# Patient Record
Sex: FEMALE | Race: WHITE | Employment: PART TIME | URBAN - METROPOLITAN AREA
[De-identification: names, ages, dates, MRNs, and addresses within clinical notes are randomized per-mention and may not be internally consistent; named-entity substitution may affect disease eponyms.]

---

## 2017-06-16 ENCOUNTER — OFFICE VISIT (OUTPATIENT)
Dept: INTERNAL MEDICINE CLINIC | Facility: CLINIC | Age: 22
End: 2017-06-16

## 2017-06-16 VITALS
BODY MASS INDEX: 19.77 KG/M2 | WEIGHT: 132 LBS | DIASTOLIC BLOOD PRESSURE: 84 MMHG | TEMPERATURE: 98 F | OXYGEN SATURATION: 97 % | HEART RATE: 80 BPM | RESPIRATION RATE: 18 BRPM | HEIGHT: 68.5 IN | SYSTOLIC BLOOD PRESSURE: 102 MMHG

## 2017-06-16 DIAGNOSIS — Z00.00 ANNUAL PHYSICAL EXAM: ICD-10-CM

## 2017-06-16 DIAGNOSIS — Z02.89 ENCOUNTER FOR PHYSICAL EXAMINATION RELATED TO EMPLOYMENT: Primary | ICD-10-CM

## 2017-06-16 PROCEDURE — 90715 TDAP VACCINE 7 YRS/> IM: CPT | Performed by: INTERNAL MEDICINE

## 2017-06-16 PROCEDURE — 99395 PREV VISIT EST AGE 18-39: CPT | Performed by: INTERNAL MEDICINE

## 2017-06-16 PROCEDURE — 86580 TB INTRADERMAL TEST: CPT | Performed by: INTERNAL MEDICINE

## 2017-06-16 PROCEDURE — 90471 IMMUNIZATION ADMIN: CPT | Performed by: INTERNAL MEDICINE

## 2017-06-16 NOTE — PROGRESS NOTES
Pt presents for TB test and Tdap injection. Full name and  verified. Pt states never testing positive for PPD. PPD 0.1ml administered intradermal to right forearm. Tolerated well. Pt informed to return to office 48-72 hours for TB read.  Voiced [place]

## 2017-06-16 NOTE — PROGRESS NOTES
Gill Ashraf is a 24year old female. Patient presents with:  Physical: Pt states needs a physical for work, 1 step tb test, and tdap. Pt states last pap 2 years ago.       HPI:   Gill Ashraf is a 24year old female who presents for a complete phy cleft palate surg.     OTHER SURGICAL HISTORY Right Sept 2014    Comment Removal of R breast benign tumor    OTHER SURGICAL HISTORY  Dec 12, 2014    Comment Removal of [place] teeth      Family History   Problem Relation Age of Onset   • Diabetes Paternal Gr will return on Monday to have it read. - TB INTRADERMAL TEST  - TETANUS, DIPHTHERIA TOXOIDS AND ACELLULAR PERTUSIS VACCINE (TDAP), >7 YEARS, IM USE    2. Annual physical exam  TDAP given today; normal pap done with gyne last year.    - TETANUS, DIPHTHERIA

## 2017-06-19 ENCOUNTER — APPOINTMENT (OUTPATIENT)
Dept: INTERNAL MEDICINE CLINIC | Facility: CLINIC | Age: 22
End: 2017-06-19

## 2017-06-19 DIAGNOSIS — Z09 NEED FOR IMMUNIZATION FOLLOW-UP: Primary | ICD-10-CM

## 2017-07-08 RX ORDER — SELENIUM SULFIDE 2.5 MG/100ML
LOTION TOPICAL
Qty: 118 ML | Refills: 1 | Status: SHIPPED | OUTPATIENT
Start: 2017-07-08 | End: 2018-09-28

## 2017-07-08 NOTE — TELEPHONE ENCOUNTER
Rx for Selenium Sulfide 2.5% lotion, apply to affected area for 10 min daily for 1 week,   118 mL, 1 refill ERx'd to [facility] in [place] per [facility] written order.

## 2017-08-14 NOTE — TELEPHONE ENCOUNTER
Pt. has HMO insurance now and needs a written referral to her Dr. Mercy Moreno. Pt. Has an appointment with that  On Thursday 8-17-17. She can be reached at [phone].

## 2017-08-14 NOTE — TELEPHONE ENCOUNTER
Please advise - called patient who is seeing DR. Seng Sawyer [facility] for ADHD - referral started and pending - to DR. Isabel Medeiros

## 2017-08-15 NOTE — TELEPHONE ENCOUNTER
Patient has to contact [facility] for a referral.   [phone]    Thank you, Todd

## 2017-08-15 NOTE — TELEPHONE ENCOUNTER
Called patient and left voicemail per hipaa relaying that referral was ordered and forwarded to managed care department. Instructed to call back with any questions.     To managed care---patient has appt on 8/17/17

## 2017-09-07 ENCOUNTER — OFFICE VISIT (OUTPATIENT)
Dept: INTERNAL MEDICINE CLINIC | Facility: CLINIC | Age: 22
End: 2017-09-07

## 2017-09-07 VITALS
BODY MASS INDEX: 19.77 KG/M2 | TEMPERATURE: 99 F | WEIGHT: 132 LBS | HEART RATE: 72 BPM | HEIGHT: 68.5 IN | DIASTOLIC BLOOD PRESSURE: 64 MMHG | SYSTOLIC BLOOD PRESSURE: 98 MMHG

## 2017-09-07 DIAGNOSIS — N64.4 BREAST TENDERNESS: Primary | ICD-10-CM

## 2017-09-07 PROCEDURE — 99212 OFFICE O/P EST SF 10 MIN: CPT | Performed by: INTERNAL MEDICINE

## 2017-09-07 PROCEDURE — 99213 OFFICE O/P EST LOW 20 MIN: CPT | Performed by: INTERNAL MEDICINE

## 2017-09-07 NOTE — PROGRESS NOTES
Tripp Yepez is a 20year old female. Patient presents with:  Breast Pain    HPI:     Pt thinks she felt a lump in her right lateral breast, along with some soreness. Also with some soreness in left lateral breast.  Comes and goes. No new bra, meds. LMP 08/11/2017 (Exact Date)   BMI 19.78 kg/m²   GENERAL: well developed, well nourished, in no apparent distress  BREASTS: very dense breasts bilat, +vertical ridge of firm breast tissue along lateral right breast, +tender. No discrete masses.   Mild tende

## 2017-11-28 ENCOUNTER — TELEPHONE (OUTPATIENT)
Dept: INTERNAL MEDICINE CLINIC | Facility: CLINIC | Age: 22
End: 2017-11-28

## 2017-11-28 NOTE — TELEPHONE ENCOUNTER
[phone] mother Zack Heard  Pt has a new job and needs a note confirming that she had chickenpox as a child  Please fax to mother by Friday.    [phone]

## 2017-11-28 NOTE — TELEPHONE ENCOUNTER
To Dr. Monique Ends - see below - pt starting new job in hospital, mother states pt had chicken pox when she was around 3-4 y/o. She never received immunization. Did not find documentation in records that were sent to us. Mother will also call pediatrician's office to see if they have documentation.

## 2018-01-22 ENCOUNTER — OFFICE VISIT (OUTPATIENT)
Dept: INTERNAL MEDICINE CLINIC | Facility: CLINIC | Age: 23
End: 2018-01-22

## 2018-01-22 VITALS
HEIGHT: 68.5 IN | HEART RATE: 100 BPM | WEIGHT: 131 LBS | BODY MASS INDEX: 19.63 KG/M2 | OXYGEN SATURATION: 99 % | DIASTOLIC BLOOD PRESSURE: 66 MMHG | SYSTOLIC BLOOD PRESSURE: 108 MMHG | TEMPERATURE: 98 F

## 2018-01-22 DIAGNOSIS — M79.10 MYALGIA: Primary | ICD-10-CM

## 2018-01-22 DIAGNOSIS — J01.00 ACUTE NON-RECURRENT MAXILLARY SINUSITIS: ICD-10-CM

## 2018-01-22 LAB
FLUAV + FLUBV RNA SPEC NAA+PROBE: NEGATIVE
FLUAV + FLUBV RNA SPEC NAA+PROBE: NEGATIVE
FLUAV + FLUBV RNA SPEC NAA+PROBE: POSITIVE

## 2018-01-22 PROCEDURE — 99213 OFFICE O/P EST LOW 20 MIN: CPT | Performed by: INTERNAL MEDICINE

## 2018-01-22 PROCEDURE — 99212 OFFICE O/P EST SF 10 MIN: CPT | Performed by: INTERNAL MEDICINE

## 2018-01-22 NOTE — PROGRESS NOTES
Tripp Yepez is a 20year old female. Patient presents with:  Cough: Complaints of cough with sputum, chest congestion, fatigue - since friday = works in [employer] and the unit that she worked had flu outbreak       HPI:   Tripp Yepez is a benign tumor  Dec 12, 2014: OTHER SURGICAL HISTORY      Comment: Removal of [place] teeth   Family History   Problem Relation Age of Onset   • Other [OTHER] Mother      MOM W/ BREAST FIBRORYSTIS DISEASE   • Diabetes Father 52   • Diabetes Paternal [place]

## 2018-01-23 ENCOUNTER — TELEPHONE (OUTPATIENT)
Dept: INTERNAL MEDICINE CLINIC | Facility: CLINIC | Age: 23
End: 2018-01-23

## 2018-01-23 RX ORDER — OSELTAMIVIR PHOSPHATE 75 MG/1
75 CAPSULE ORAL 2 TIMES DAILY
Qty: 10 CAPSULE | Refills: 0 | Status: SHIPPED | OUTPATIENT
Start: 2018-01-23 | End: 2018-12-06

## 2018-01-23 NOTE — TELEPHONE ENCOUNTER
Received phone call from [place] in lab notifying us of +Influenza A. Dr Caroline Serna has already seen results and advised below. Called patient and relayed Dr Dean Mosley message below. Patient verbalized understanding.

## 2018-01-23 NOTE — TELEPHONE ENCOUNTER
Please notify patient of positive flu swab. I sent in tamiflu for her. She needs to work on hydration.

## 2018-01-23 NOTE — TELEPHONE ENCOUNTER
Spoke with patient and relayed Dr. Xi Rojas' message. Patient verbalized an understanding and states that she is off Thursday. She is requesting the letter be changed to say that she can return on Friday 1/26.  Dr. Xi Rojas verbally notified and new letter

## 2018-01-29 NOTE — TELEPHONE ENCOUNTER
Pt. Wants to know if Dr. Traci Child can write her a return to work note. Pt. Zahra Life to work Friday and left early because she wasn't feeling good. She didn't go to work on Sat. Or SUn. Pt. Will be going to work today  Please advise  Ph.  # [phone]   Routed to [facility]

## 2018-09-28 ENCOUNTER — TELEPHONE (OUTPATIENT)
Dept: INTERNAL MEDICINE CLINIC | Facility: CLINIC | Age: 23
End: 2018-09-28

## 2018-09-28 DIAGNOSIS — B36.0 TINEA VERSICOLOR: Primary | ICD-10-CM

## 2018-09-28 RX ORDER — SELENIUM SULFIDE 2.5 MG/100ML
LOTION TOPICAL
Qty: 1 BOTTLE | Refills: 0 | Status: SHIPPED | OUTPATIENT
Start: 2018-09-28 | End: 2020-11-12 | Stop reason: ALTCHOICE

## 2018-09-28 NOTE — TELEPHONE ENCOUNTER
LOV: 6-3-16 pt requesting refill on Selenium sulfide 2.5% External lotion. Spoke with pt that she has not been seen in office since 2016 and would need an f/u appt for further refills.  Pt verbalized she has been using the lotion more frequent then she norm

## 2018-09-28 NOTE — TELEPHONE ENCOUNTER
Pt called needs referral for appt with dermatology - Dr Jaciel Duque  Oct 1     Please call pt to confirm [phone]

## 2018-09-28 NOTE — TELEPHONE ENCOUNTER
Spoke to patient who states [facility] this issue (tinea versicolor) and has appointment scheduled for Monday 10/1. Referral generated per protocol.  To Dr.Spirakes DOUGHERTY

## 2018-10-01 ENCOUNTER — OFFICE VISIT (OUTPATIENT)
Dept: DERMATOLOGY CLINIC | Facility: CLINIC | Age: 23
End: 2018-10-01

## 2018-10-01 DIAGNOSIS — B36.0 TINEA VERSICOLOR: Primary | ICD-10-CM

## 2018-10-01 PROCEDURE — 99212 OFFICE O/P EST SF 10 MIN: CPT | Performed by: DERMATOLOGY

## 2018-10-01 PROCEDURE — 99213 OFFICE O/P EST LOW 20 MIN: CPT | Performed by: DERMATOLOGY

## 2018-10-01 RX ORDER — TERBINAFINE HYDROCHLORIDE 250 MG/1
250 TABLET ORAL DAILY
Qty: 14 TABLET | Refills: 0 | Status: SHIPPED | OUTPATIENT
Start: 2018-10-01 | End: 2018-10-15

## 2018-10-01 RX ORDER — SELENIUM SULFIDE 2.5 MG/100ML
LOTION TOPICAL
Qty: 1 BOTTLE | Refills: 3 | Status: SHIPPED | OUTPATIENT
Start: 2018-10-01 | End: 2018-12-06

## 2018-10-01 NOTE — PROGRESS NOTES
HPI:     Chief Complaint     Derm Problem        HPI     Derm Problem      Additional comments: LOV 06/03/16 pt seen for rash here for flare up on \"blotchy skin\" pt has discolored patches on her upper body that she things was triggered by increased stess total) by mouth 2 (two) times daily.  Disp: 10 capsule Rfl: 0     Allergies:     Dog Dander [Dander]       Seasonal                    Past Medical History:   Diagnosis Date   • Anxiety state, unspecified 2003    Paxil   • Attention deficit disorder with hy Age of Onset   • Other (Other) Mother         MOM W/ BREAST FIBRORYSTIS DISEASE   • Diabetes Father 52   • Diabetes Paternal Grandmother    • Diabetes Paternal Grandfather    • Asthma Other         P AUNT   • Asthma Other         MATERNAL COUSINS   • Other

## 2018-12-06 ENCOUNTER — HOSPITAL ENCOUNTER (OUTPATIENT)
Dept: GENERAL RADIOLOGY | Age: 23
Discharge: HOME | End: 2018-12-06
Attending: INTERNAL MEDICINE
Payer: COMMERCIAL

## 2018-12-06 ENCOUNTER — OFFICE VISIT (OUTPATIENT)
Dept: INTERNAL MEDICINE CLINIC | Facility: CLINIC | Age: 23
End: 2018-12-06

## 2018-12-06 ENCOUNTER — TELEPHONE (OUTPATIENT)
Dept: INTERNAL MEDICINE CLINIC | Facility: CLINIC | Age: 23
End: 2018-12-06

## 2018-12-06 VITALS
TEMPERATURE: 99 F | WEIGHT: 132 LBS | SYSTOLIC BLOOD PRESSURE: 92 MMHG | HEIGHT: 68 IN | BODY MASS INDEX: 20 KG/M2 | DIASTOLIC BLOOD PRESSURE: 68 MMHG | HEART RATE: 64 BPM

## 2018-12-06 DIAGNOSIS — M25.562 LEFT MEDIAL KNEE PAIN: ICD-10-CM

## 2018-12-06 DIAGNOSIS — M25.562 LEFT MEDIAL KNEE PAIN: Primary | ICD-10-CM

## 2018-12-06 PROCEDURE — 99213 OFFICE O/P EST LOW 20 MIN: CPT | Performed by: INTERNAL MEDICINE

## 2018-12-06 PROCEDURE — 73562 X-RAY EXAM OF KNEE 3: CPT | Performed by: INTERNAL MEDICINE

## 2018-12-06 PROCEDURE — 99212 OFFICE O/P EST SF 10 MIN: CPT | Performed by: INTERNAL MEDICINE

## 2018-12-06 RX ORDER — MELOXICAM 7.5 MG/1
TABLET ORAL
Qty: 30 TABLET | Refills: 1 | Status: SHIPPED | OUTPATIENT
Start: 2018-12-06 | End: 2019-11-06 | Stop reason: ALTCHOICE

## 2018-12-06 NOTE — PROGRESS NOTES
Lj Jones is a 24year old female. Patient presents with:  Knee Pain: was helping neighbor push a car and fell directly down on knee    HPI:     Last week she was helping her neighbors push their cars out of the snow, when she slipped and fell onto Ht 5' 8\" (1.727 m)   Wt 132 lb (59.9 kg)   LMP 11/30/2018 (Exact Date)   BMI 20.07 kg/m²   GENERAL: well developed, well nourished, in no apparent distress  EXTREMITIES (left knee): No knee swelling. No patellar tenderness.   Mild tenderness left medial

## 2018-12-31 ENCOUNTER — WALK IN (OUTPATIENT)
Dept: URGENT CARE | Age: 23
End: 2018-12-31

## 2018-12-31 DIAGNOSIS — J02.0 STREP PHARYNGITIS: Primary | ICD-10-CM

## 2018-12-31 LAB
INTERNAL PROCEDURAL CONTROLS ACCEPTABLE: YES
S PYO AG THROAT QL IA.RAPID: POSITIVE

## 2018-12-31 PROCEDURE — X0943 AMG SELF PAY VISIT: HCPCS | Performed by: NURSE PRACTITIONER

## 2018-12-31 RX ORDER — METHYLPHENIDATE HYDROCHLORIDE 36 MG/1
72 TABLET ORAL EVERY MORNING
COMMUNITY

## 2018-12-31 RX ORDER — AMOXICILLIN 875 MG/1
875 TABLET, COATED ORAL 2 TIMES DAILY
Qty: 20 TABLET | Refills: 0 | Status: SHIPPED | OUTPATIENT
Start: 2018-12-31

## 2018-12-31 SDOH — HEALTH STABILITY: MENTAL HEALTH: HOW OFTEN DO YOU HAVE A DRINK CONTAINING ALCOHOL?: NEVER

## 2018-12-31 ASSESSMENT — PAIN SCALES - GENERAL: PAINLEVEL: 9-10

## 2019-05-10 ENCOUNTER — LAB REQUISITION (OUTPATIENT)
Dept: LAB | Facility: HOSPITAL | Age: 24
End: 2019-05-10
Payer: COMMERCIAL

## 2019-05-10 DIAGNOSIS — Z12.4 ENCOUNTER FOR SCREENING FOR MALIGNANT NEOPLASM OF CERVIX: ICD-10-CM

## 2019-05-10 PROCEDURE — 88175 CYTOPATH C/V AUTO FLUID REDO: CPT | Performed by: NURSE PRACTITIONER

## 2019-11-06 ENCOUNTER — OFFICE VISIT (OUTPATIENT)
Dept: INTERNAL MEDICINE CLINIC | Facility: CLINIC | Age: 24
End: 2019-11-06

## 2019-11-06 VITALS
SYSTOLIC BLOOD PRESSURE: 104 MMHG | DIASTOLIC BLOOD PRESSURE: 62 MMHG | HEART RATE: 78 BPM | BODY MASS INDEX: 20.16 KG/M2 | OXYGEN SATURATION: 98 % | TEMPERATURE: 99 F | HEIGHT: 68 IN | WEIGHT: 133 LBS

## 2019-11-06 DIAGNOSIS — Z00.00 ROUTINE HEALTH MAINTENANCE: Primary | ICD-10-CM

## 2019-11-06 DIAGNOSIS — R53.83 OTHER FATIGUE: ICD-10-CM

## 2019-11-06 DIAGNOSIS — R35.0 URINARY FREQUENCY: ICD-10-CM

## 2019-11-06 DIAGNOSIS — F98.8 ATTENTION DEFICIT DISORDER (ADD) WITHOUT HYPERACTIVITY: ICD-10-CM

## 2019-11-06 PROCEDURE — 99395 PREV VISIT EST AGE 18-39: CPT | Performed by: INTERNAL MEDICINE

## 2019-11-06 PROCEDURE — 86580 TB INTRADERMAL TEST: CPT | Performed by: INTERNAL MEDICINE

## 2019-11-06 NOTE — PROGRESS NOTES
Mabel Vergara is a 21year old female. Patient presents with:  Physical: Pap done May 2019 Dr. Lanny Rees. 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  Needs TB skin test. Patient has been cutting alcohol and caffeine out of her diet to s SURGICAL HISTORY Right Sept 2014    Removal of R breast benign tumor   • OTHER SURGICAL HISTORY  Dec 12, 2014    Removal of [place] teeth      Family History   Problem Relation Age of Onset   • Other (Other) Mother         MOM W/ BREAST FIBRORYSTIS DISEASE exercise. School forms filled out. Had flu shot already.     ADD  Sees Dr. Reed Onslow; on Concerta      Jessica Rhodes MD  11/6/2019  3:26 PM

## 2019-11-07 ENCOUNTER — LAB ENCOUNTER (OUTPATIENT)
Dept: LAB | Age: 24
End: 2019-11-07
Attending: INTERNAL MEDICINE
Payer: COMMERCIAL

## 2019-11-07 DIAGNOSIS — R53.83 OTHER FATIGUE: ICD-10-CM

## 2019-11-07 DIAGNOSIS — Z00.00 ROUTINE HEALTH MAINTENANCE: ICD-10-CM

## 2019-11-07 PROCEDURE — 80053 COMPREHEN METABOLIC PANEL: CPT

## 2019-11-07 PROCEDURE — 82306 VITAMIN D 25 HYDROXY: CPT

## 2019-11-07 PROCEDURE — 85025 COMPLETE CBC W/AUTO DIFF WBC: CPT

## 2019-11-07 PROCEDURE — 36415 COLL VENOUS BLD VENIPUNCTURE: CPT

## 2019-11-07 PROCEDURE — 84443 ASSAY THYROID STIM HORMONE: CPT | Performed by: INTERNAL MEDICINE

## 2019-11-07 PROCEDURE — 81001 URINALYSIS AUTO W/SCOPE: CPT | Performed by: INTERNAL MEDICINE

## 2019-11-07 PROCEDURE — 80061 LIPID PANEL: CPT

## 2019-11-12 ENCOUNTER — TELEPHONE (OUTPATIENT)
Dept: INTERNAL MEDICINE CLINIC | Facility: CLINIC | Age: 24
End: 2019-11-12

## 2019-11-12 ENCOUNTER — APPOINTMENT (OUTPATIENT)
Dept: OTHER | Facility: HOSPITAL | Age: 24
End: 2019-11-12
Attending: ORTHOPAEDIC SURGERY

## 2019-11-12 DIAGNOSIS — R31.29 MICROHEMATURIA: Primary | ICD-10-CM

## 2019-11-12 NOTE — TELEPHONE ENCOUNTER
Pt is calling to check on immunizations that she had gotten done.      Please call pt back @ [phone]

## 2019-11-13 ENCOUNTER — OFFICE VISIT (OUTPATIENT)
Dept: OTHER | Facility: HOSPITAL | Age: 24
End: 2019-11-13
Attending: EMERGENCY MEDICINE

## 2019-11-13 DIAGNOSIS — Z00.00 ROUTINE GENERAL MEDICAL EXAMINATION AT A HEALTH CARE FACILITY: Primary | ICD-10-CM

## 2019-11-13 PROCEDURE — 86762 RUBELLA ANTIBODY: CPT

## 2019-11-13 PROCEDURE — 86765 RUBEOLA ANTIBODY: CPT

## 2019-11-13 PROCEDURE — 86735 MUMPS ANTIBODY: CPT

## 2019-11-13 PROCEDURE — 86787 VARICELLA-ZOSTER ANTIBODY: CPT

## 2019-11-13 PROCEDURE — 86706 HEP B SURFACE ANTIBODY: CPT

## 2019-11-13 NOTE — TELEPHONE ENCOUNTER
I spoke with patient and relayed Dr. Cameron Yang message. She verbalized understanding. She does not think she had her period when she gave the urine specimen. She thinks her period ended over a week ago. She denied any concerning urinary symptoms.  To Dr. Lu Garcia

## 2019-11-15 ENCOUNTER — TELEPHONE (OUTPATIENT)
Dept: INTERNAL MEDICINE CLINIC | Facility: CLINIC | Age: 24
End: 2019-11-15

## 2019-11-15 NOTE — TELEPHONE ENCOUNTER
Spoke to Bernard Klein regarding scheduling/availability of vaccines. Per Bernard Klein:  Patient able to receive vaccines at [facility] office, however Bernard Klein needs notification for varicella vaccine on the day patient arrives.      Patient IS able to receive all vaccines at

## 2019-11-15 NOTE — TELEPHONE ENCOUNTER
Orders placed for MMR x one, and varicella vaccine x one    Hepatitis vaccine #1: day 1  Hepatitis vaccine #2: day 1 + 30 days  Hepatitis vaccine #3; day 1 + 6 months    Needs Hep B surface antibody titer in 7 months    Please call pt

## 2019-11-15 NOTE — TELEPHONE ENCOUNTER
I spoke with patient and relayed message from Dr. Terri Fry. She verbalized understanding. Patient scheduled for lab visit next Thursday. Appointment scheduled.

## 2019-11-15 NOTE — TELEPHONE ENCOUNTER
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

## 2019-11-21 ENCOUNTER — NURSE ONLY (OUTPATIENT)
Dept: INTERNAL MEDICINE CLINIC | Facility: CLINIC | Age: 24
End: 2019-11-21

## 2019-11-21 PROCEDURE — 90472 IMMUNIZATION ADMIN EACH ADD: CPT | Performed by: INTERNAL MEDICINE

## 2019-11-21 PROCEDURE — 90471 IMMUNIZATION ADMIN: CPT | Performed by: INTERNAL MEDICINE

## 2019-11-21 PROCEDURE — 90716 VAR VACCINE LIVE SUBQ: CPT | Performed by: INTERNAL MEDICINE

## 2019-11-21 PROCEDURE — 90707 MMR VACCINE SC: CPT | Performed by: INTERNAL MEDICINE

## 2019-11-21 PROCEDURE — 90746 HEPB VACCINE 3 DOSE ADULT IM: CPT | Performed by: INTERNAL MEDICINE

## 2020-02-17 ENCOUNTER — TELEPHONE (OUTPATIENT)
Dept: INTERNAL MEDICINE CLINIC | Facility: CLINIC | Age: 25
End: 2020-02-17

## 2020-02-17 NOTE — TELEPHONE ENCOUNTER
Pt is asking when she is do for her booster shots she knows she had a few and was told to come back. But she cant remember.

## 2020-02-18 NOTE — TELEPHONE ENCOUNTER
LMTCB with patient-- per 11/15/19 Telephone encounter patient was due for Varicella, MMR, and Hep B (series of 3). Per 11/21/19 Nurse visit patient had Varicella, MMR, and First Hep B vaccine. She was due for 2nd Hep B vaccine 12/22/19 or thereafter.  3rd

## 2020-02-18 NOTE — TELEPHONE ENCOUNTER
Notified patient of José Ott message below. All questions answered. Patient verbalized understanding. Call transferred to  to schedule appt(s) for vaccines.

## 2020-02-18 NOTE — TELEPHONE ENCOUNTER
Pt called back    Requests call back from RN to advise on vaccines she needs to follow up on    Please advise [phone]

## 2020-02-20 ENCOUNTER — NURSE ONLY (OUTPATIENT)
Dept: INTERNAL MEDICINE CLINIC | Facility: CLINIC | Age: 25
End: 2020-02-20

## 2020-02-20 DIAGNOSIS — Z23 NEED FOR HEPATITIS B VACCINATION: Primary | ICD-10-CM

## 2020-02-20 PROCEDURE — 90746 HEPB VACCINE 3 DOSE ADULT IM: CPT | Performed by: INTERNAL MEDICINE

## 2020-02-20 PROCEDURE — 90471 IMMUNIZATION ADMIN: CPT | Performed by: INTERNAL MEDICINE

## 2020-02-20 NOTE — PROGRESS NOTES
Patient presents for 2nd Hepatitis B vaccine. Name/ and order verified (refer to 11/15/19 telephone task). Patient received first shot series in 2019 and did not return for the 2nd shot. She presents today to complete series (Hep B #2).  Hep B a

## 2020-06-02 ENCOUNTER — NURSE ONLY (OUTPATIENT)
Dept: INTERNAL MEDICINE CLINIC | Facility: CLINIC | Age: 25
End: 2020-06-02

## 2020-06-02 PROCEDURE — 90746 HEPB VACCINE 3 DOSE ADULT IM: CPT | Performed by: INTERNAL MEDICINE

## 2020-06-02 PROCEDURE — 90471 IMMUNIZATION ADMIN: CPT | Performed by: INTERNAL MEDICINE

## 2020-06-02 NOTE — PROGRESS NOTES
Patient presents for 3rd Hepatitis B vaccine. Name/ and order verified (refer to 11/15/19 telephone task). Pt presents today to complete series (Hep B #3).  Spoke to Pharmacist who verified that immunization can be given early if patient requests it to b

## 2020-07-13 ENCOUNTER — TELEPHONE (OUTPATIENT)
Dept: INTERNAL MEDICINE CLINIC | Facility: CLINIC | Age: 25
End: 2020-07-13

## 2020-07-13 DIAGNOSIS — Z11.1 TUBERCULOSIS SCREENING: ICD-10-CM

## 2020-07-13 DIAGNOSIS — Z01.84 IMMUNITY STATUS TESTING: Primary | ICD-10-CM

## 2020-07-13 NOTE — TELEPHONE ENCOUNTER
Skip is dropping off the form for her school  She is also requesting a copy of her vaccinations    Please call patient [phone]

## 2020-07-13 NOTE — TELEPHONE ENCOUNTER
Pt. Is calling to see if she she can have a quantiferon gold tb blood test and IGG titer done at the lab here please advise ph. 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 Verduzco Manual to complete routed to clinical

## 2020-07-13 NOTE — TELEPHONE ENCOUNTER
Patient will look into what blood titers her school is requesting and will drop off that information along with form. She received 3rd Hep B shot on 6/2/20 (in epic). Will it be okay to draw titers now?     To Dr. Curtis Kennedy

## 2020-07-15 ENCOUNTER — APPOINTMENT (OUTPATIENT)
Dept: LAB | Age: 25
End: 2020-07-15
Attending: INTERNAL MEDICINE
Payer: COMMERCIAL

## 2020-07-15 DIAGNOSIS — Z11.1 TUBERCULOSIS SCREENING: ICD-10-CM

## 2020-07-15 DIAGNOSIS — Z01.84 IMMUNITY STATUS TESTING: ICD-10-CM

## 2020-07-15 LAB
HBV SURFACE AB SER QL: REACTIVE
HBV SURFACE AB SERPL IA-ACNC: >1000 MIU/ML
RUBV IGG SER QL: POSITIVE
RUBV IGG SER-ACNC: 23.1 IU/ML (ref 10–?)

## 2020-07-15 PROCEDURE — 86480 TB TEST CELL IMMUN MEASURE: CPT

## 2020-07-15 PROCEDURE — 86706 HEP B SURFACE ANTIBODY: CPT

## 2020-07-15 PROCEDURE — 86762 RUBELLA ANTIBODY: CPT

## 2020-07-15 PROCEDURE — 86735 MUMPS ANTIBODY: CPT

## 2020-07-15 PROCEDURE — 36415 COLL VENOUS BLD VENIPUNCTURE: CPT

## 2020-07-15 PROCEDURE — 86787 VARICELLA-ZOSTER ANTIBODY: CPT

## 2020-07-15 PROCEDURE — 86765 RUBEOLA ANTIBODY: CPT

## 2020-07-15 NOTE — TELEPHONE ENCOUNTER
Patient called back and was relayed 's message. Patient states that she has had the HPV series done through her gyne. Patient will get the labs done later today.

## 2020-07-15 NOTE — TELEPHONE ENCOUNTER
Hep B test ordered along with quant gold. Form filled out. Immunization record printed --- in my outbox  I do not see that she has had the HPV vaccine series (Gardasil). I strongly recommend that she consider doing this.   She can have it at our office

## 2020-07-15 NOTE — TELEPHONE ENCOUNTER
Pt. Had her titers done today. She will need a hard copy of those results when they come in for her school. Please call her at [phone] when ready for .

## 2020-07-17 ENCOUNTER — TELEPHONE (OUTPATIENT)
Dept: INTERNAL MEDICINE CLINIC | Facility: CLINIC | Age: 25
End: 2020-07-17

## 2020-07-17 LAB
M TB IFN-G CD4+ T-CELLS BLD-ACNC: 0.02 IU/ML
M TB TUBERC IFN-G BLD QL: NEGATIVE
M TB TUBERC IGNF/MITOGEN IGNF CONTROL: 9.44 IU/ML
QUANTIFERON TB1 MINUS NIL: 0 IU/ML
QUANTIFERON TB2 MINUS NIL: 0 IU/ML

## 2020-07-17 NOTE — TELEPHONE ENCOUNTER
Pt called back  Needs results today for her [address] [facility] [address]   Also needs a hard copy

## 2020-07-20 LAB — VZV IGG SER IA-ACNC: 259.9 (ref 165–?)

## 2020-07-21 LAB
MEV IGG SER-ACNC: >300 AU/ML (ref 16.5–?)
MUV IGG SER IA-ACNC: 38 AU/ML (ref 11–?)

## 2020-07-21 NOTE — TELEPHONE ENCOUNTER
Patient called and relayed Dr Alvin Au message. Patient verbalized understanding and will  labs at [facility]  on Friday 7/24/20 due to currently being on vacation. Patient states she does need lab results faxed anywhere.

## 2020-07-21 NOTE — TELEPHONE ENCOUNTER
Labs show immunity to all the diseases tested.   Okay to fax results to her program and to mail her a hard copy

## 2020-09-29 ENCOUNTER — IMMUNIZATION (OUTPATIENT)
Dept: INTERNAL MEDICINE CLINIC | Facility: CLINIC | Age: 25
End: 2020-09-29

## 2020-09-29 DIAGNOSIS — Z23 NEED FOR VACCINATION: ICD-10-CM

## 2020-09-29 PROCEDURE — 90471 IMMUNIZATION ADMIN: CPT | Performed by: INTERNAL MEDICINE

## 2020-09-29 PROCEDURE — 90686 IIV4 VACC NO PRSV 0.5 ML IM: CPT | Performed by: INTERNAL MEDICINE

## 2020-10-28 ENCOUNTER — LAB REQUISITION (OUTPATIENT)
Dept: LAB | Facility: HOSPITAL | Age: 25
End: 2020-10-28
Payer: COMMERCIAL

## 2020-10-28 DIAGNOSIS — Z12.4 ENCOUNTER FOR SCREENING FOR MALIGNANT NEOPLASM OF CERVIX: ICD-10-CM

## 2020-10-28 PROCEDURE — 88175 CYTOPATH C/V AUTO FLUID REDO: CPT | Performed by: NURSE PRACTITIONER

## 2020-10-28 PROCEDURE — 87624 HPV HI-RISK TYP POOLED RSLT: CPT | Performed by: NURSE PRACTITIONER

## 2020-11-12 ENCOUNTER — OFFICE VISIT (OUTPATIENT)
Dept: INTERNAL MEDICINE CLINIC | Facility: CLINIC | Age: 25
End: 2020-11-12

## 2020-11-12 VITALS
WEIGHT: 137 LBS | OXYGEN SATURATION: 98 % | DIASTOLIC BLOOD PRESSURE: 76 MMHG | HEIGHT: 68 IN | BODY MASS INDEX: 20.76 KG/M2 | SYSTOLIC BLOOD PRESSURE: 122 MMHG | HEART RATE: 86 BPM

## 2020-11-12 DIAGNOSIS — M23.90 INTERNAL DERANGEMENT OF KNEE, UNSPECIFIED LATERALITY: Primary | ICD-10-CM

## 2020-11-12 PROCEDURE — 3078F DIAST BP <80 MM HG: CPT | Performed by: INTERNAL MEDICINE

## 2020-11-12 PROCEDURE — 3074F SYST BP LT 130 MM HG: CPT | Performed by: INTERNAL MEDICINE

## 2020-11-12 PROCEDURE — 3008F BODY MASS INDEX DOCD: CPT | Performed by: INTERNAL MEDICINE

## 2020-11-12 PROCEDURE — 99213 OFFICE O/P EST LOW 20 MIN: CPT | Performed by: INTERNAL MEDICINE

## 2020-11-12 RX ORDER — MELOXICAM 7.5 MG/1
7.5 TABLET ORAL DAILY
Qty: 30 TABLET | Refills: 1 | Status: SHIPPED | OUTPATIENT
Start: 2020-11-12 | End: 2021-01-08

## 2020-11-12 NOTE — PROGRESS NOTES
Jaquelin Hoover is a 31year old female. Patient presents with:  Checkup: EMI Knee Pain; R>L. Also with \"tingling in LT Thumb and on LT Leg while shaving\". 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 Rates Pain 3/10.  Last 2 weeks kg)  11/06/19 : 133 lb (60.3 kg)  12/06/18 : 132 lb (59.9 kg)  01/22/18 : 131 lb (59.4 kg)  09/07/17 : 132 lb (59.9 kg)    Body mass index is 20.83 kg/m².       EXAM:   /76   Pulse 86   Ht 5' 8\" (1.727 m)   Wt 137 lb (62.1 kg)   SpO2 98%   BMI 20.83

## 2021-01-04 NOTE — TELEPHONE ENCOUNTER
RX filled short term in November 1. Internal derangement of knees, R>L  Pain mainly with kneeling. Normal exam today. Trial of meloxicam 7.5mg/day. Ortho if sx persist.    Tried calling patient. Someone answered, was quiet, then hung up.  Will await ga

## 2021-01-07 NOTE — TELEPHONE ENCOUNTER
[facility] clinical team please try calling patient again and see if she requested the refill and how she is doing since starting the meloxicam

## 2021-01-08 RX ORDER — MELOXICAM 7.5 MG/1
TABLET ORAL
Qty: 30 TABLET | Refills: 1 | OUTPATIENT
Start: 2021-01-08

## 2021-01-08 NOTE — TELEPHONE ENCOUNTER
Requested Prescriptions     Refused Prescriptions Disp Refills   • MELOXICAM 7.5 MG Oral Tab [Pharmacy Med Name: MELOXICAM 7.5MG TABLETS] 30 tablet 1     Sig: TAKE 1 TABLET(7.5 MG) BY MOUTH DAILY     Refused By: Britt Rios     Reason for Refusa

## 2021-04-21 ENCOUNTER — OFFICE VISIT (OUTPATIENT)
Dept: INTERNAL MEDICINE CLINIC | Facility: CLINIC | Age: 26
End: 2021-04-21

## 2021-04-21 VITALS
WEIGHT: 139 LBS | HEART RATE: 51 BPM | SYSTOLIC BLOOD PRESSURE: 108 MMHG | RESPIRATION RATE: 14 BRPM | BODY MASS INDEX: 21 KG/M2 | TEMPERATURE: 98 F | DIASTOLIC BLOOD PRESSURE: 70 MMHG | OXYGEN SATURATION: 97 %

## 2021-04-21 DIAGNOSIS — N90.7 LABIAL CYST: ICD-10-CM

## 2021-04-21 DIAGNOSIS — N64.4 BREAST PAIN, LEFT: ICD-10-CM

## 2021-04-21 DIAGNOSIS — Z00.00 ROUTINE HEALTH MAINTENANCE: Primary | ICD-10-CM

## 2021-04-21 PROCEDURE — 3078F DIAST BP <80 MM HG: CPT | Performed by: INTERNAL MEDICINE

## 2021-04-21 PROCEDURE — 3074F SYST BP LT 130 MM HG: CPT | Performed by: INTERNAL MEDICINE

## 2021-04-21 PROCEDURE — 99213 OFFICE O/P EST LOW 20 MIN: CPT | Performed by: INTERNAL MEDICINE

## 2021-04-21 NOTE — PROGRESS NOTES
Dustin Priest is a 31year old female. Patient presents with:  Breast Pain: Pt c/o L breast pain x1 month. Pt also c/o possible ingrown hair to R groin. HPI:     Intermittent tenderness in left lateral breast; worse when ovulating.   Started abo BMI 21.13 kg/m²   GENERAL: well developed, well nourished, in no apparent distress  SKIN: +small 1cm deep tender nodule in R perineum, just lateral to R labia majora -- no central pore or fluctuance  BREASTS: no masses left breast; +islands of dense tissu

## 2021-05-11 ENCOUNTER — TELEPHONE (OUTPATIENT)
Dept: INTERNAL MEDICINE CLINIC | Facility: CLINIC | Age: 26
End: 2021-05-11

## 2021-05-11 DIAGNOSIS — N64.4 BREAST PAIN, LEFT: Primary | ICD-10-CM

## 2021-05-11 NOTE — TELEPHONE ENCOUNTER
Patient last saw Dr Stella Vazquez on 4/21/2021. An order was placed on this day for the patient for a \"US Breast Left Complete. \"     Cristian Mcdowell from [facility] calling today to get a new order.  Due to patient's age the order should be ordered as

## 2021-05-14 ENCOUNTER — TELEPHONE (OUTPATIENT)
Dept: INTERNAL MEDICINE CLINIC | Facility: CLINIC | Age: 26
End: 2021-05-14

## 2021-05-14 ENCOUNTER — HOSPITAL ENCOUNTER (OUTPATIENT)
Dept: ULTRASOUND IMAGING | Facility: HOSPITAL | Age: 26
Discharge: HOME | End: 2021-05-14
Attending: INTERNAL MEDICINE
Payer: COMMERCIAL

## 2021-05-14 DIAGNOSIS — N64.4 BREAST PAIN, LEFT: ICD-10-CM

## 2021-05-14 PROCEDURE — 76642 ULTRASOUND BREAST LIMITED: CPT | Performed by: INTERNAL MEDICINE

## 2021-05-14 NOTE — TELEPHONE ENCOUNTER
To Dr. Gissel Bean to please advise on US left breast limited done today 5/14/21- due to this being ordered for acute left breast pain.  (FRANKI Dr. Mansi Holden)

## 2021-05-17 ENCOUNTER — LAB ENCOUNTER (OUTPATIENT)
Dept: LAB | Age: 26
End: 2021-05-17
Attending: INTERNAL MEDICINE
Payer: COMMERCIAL

## 2021-05-17 DIAGNOSIS — Z00.00 ROUTINE HEALTH MAINTENANCE: ICD-10-CM

## 2021-05-17 PROCEDURE — 84443 ASSAY THYROID STIM HORMONE: CPT | Performed by: INTERNAL MEDICINE

## 2021-05-17 PROCEDURE — 80048 BASIC METABOLIC PNL TOTAL CA: CPT

## 2021-05-17 PROCEDURE — 85025 COMPLETE CBC W/AUTO DIFF WBC: CPT

## 2021-05-17 PROCEDURE — 36415 COLL VENOUS BLD VENIPUNCTURE: CPT

## 2021-05-17 PROCEDURE — 80061 LIPID PANEL: CPT

## 2021-05-17 PROCEDURE — 82306 VITAMIN D 25 HYDROXY: CPT

## 2021-05-19 NOTE — TELEPHONE ENCOUNTER
Mammogram was normal other than a small benign cyst -- her breast tenderness is likely hormonal, but nothing serious. Blood tests were normal except for elevated triglycerides -- treatment is limiting alcohol and carbs.     Vit D level still pending

## 2021-05-20 ENCOUNTER — OFFICE VISIT (OUTPATIENT)
Dept: INTERNAL MEDICINE CLINIC | Facility: CLINIC | Age: 26
End: 2021-05-20

## 2021-05-20 VITALS
DIASTOLIC BLOOD PRESSURE: 72 MMHG | WEIGHT: 139 LBS | BODY MASS INDEX: 21.07 KG/M2 | OXYGEN SATURATION: 97 % | HEIGHT: 68 IN | HEART RATE: 96 BPM | TEMPERATURE: 99 F | SYSTOLIC BLOOD PRESSURE: 102 MMHG

## 2021-05-20 DIAGNOSIS — E78.1 HYPERTRIGLYCERIDEMIA: ICD-10-CM

## 2021-05-20 DIAGNOSIS — L72.3 SEBACEOUS CYST: Primary | ICD-10-CM

## 2021-05-20 PROCEDURE — 3074F SYST BP LT 130 MM HG: CPT | Performed by: INTERNAL MEDICINE

## 2021-05-20 PROCEDURE — 99212 OFFICE O/P EST SF 10 MIN: CPT | Performed by: INTERNAL MEDICINE

## 2021-05-20 PROCEDURE — 3008F BODY MASS INDEX DOCD: CPT | Performed by: INTERNAL MEDICINE

## 2021-05-20 PROCEDURE — 3078F DIAST BP <80 MM HG: CPT | Performed by: INTERNAL MEDICINE

## 2021-05-20 NOTE — PROGRESS NOTES
Lili Shi is a 31year old female. Patient presents with:  Cyst: would like to discuss cyst removal     HPI:     Has been using warm compresses for the perineal cyst without relief.   Still with pain, sometimes interferes with sleep; interferes distress  SKIN: +small 1cm deep tender nodule in R perineum, just lateral to R labia majora -- no central pore or fluctuance    ASSESSMENT AND PLAN:     Sebaceous cyst  R perineal area. Symptomatic. Not improved with warm compresses.   Refer to Dr. Fabian Plaza

## 2021-05-26 VITALS
HEART RATE: 93 BPM | RESPIRATION RATE: 18 BRPM | DIASTOLIC BLOOD PRESSURE: 60 MMHG | BODY MASS INDEX: 19.85 KG/M2 | HEIGHT: 68 IN | SYSTOLIC BLOOD PRESSURE: 100 MMHG | TEMPERATURE: 98.1 F | WEIGHT: 131 LBS

## 2021-06-25 ENCOUNTER — TELEPHONE (OUTPATIENT)
Dept: INTERNAL MEDICINE CLINIC | Facility: CLINIC | Age: 26
End: 2021-06-25

## 2021-06-25 ENCOUNTER — HOSPITAL ENCOUNTER (OUTPATIENT)
Age: 26
Discharge: HOME | End: 2021-06-25
Payer: COMMERCIAL

## 2021-06-25 VITALS
OXYGEN SATURATION: 100 % | SYSTOLIC BLOOD PRESSURE: 115 MMHG | TEMPERATURE: 98 F | RESPIRATION RATE: 17 BRPM | WEIGHT: 139 LBS | BODY MASS INDEX: 21 KG/M2 | DIASTOLIC BLOOD PRESSURE: 68 MMHG | HEART RATE: 84 BPM

## 2021-06-25 DIAGNOSIS — J01.90 ACUTE SINUSITIS, RECURRENCE NOT SPECIFIED, UNSPECIFIED LOCATION: Primary | ICD-10-CM

## 2021-06-25 PROCEDURE — 99203 OFFICE O/P NEW LOW 30 MIN: CPT | Performed by: NURSE PRACTITIONER

## 2021-06-25 RX ORDER — AMOXICILLIN AND CLAVULANATE POTASSIUM 875; 125 MG/1; MG/1
1 TABLET, FILM COATED ORAL 2 TIMES DAILY
Qty: 20 TABLET | Refills: 0 | Status: SHIPPED | OUTPATIENT
Start: 2021-06-25 | End: 2021-07-05

## 2021-06-25 NOTE — ED INITIAL ASSESSMENT (HPI)
Pt c/o sore throat x4 days, runny nose x3 days, cough, sinus and head pressure since yesterday. No fever. States taking dayquil-no relief today. States sore throat gone. Declines strep test.   Denies covid concerns. Awake/alert.  Breathing easy and even

## 2021-06-25 NOTE — TELEPHONE ENCOUNTER
Patient c/o cold symptoms. Started Tuesday with sore throat, patient taking dayquil and Flonase with little relief. Patient reports feeling pressure and pain when she applies pressure to cheek bones. Pt reports mucous is green.  Writer advised patient to g

## 2021-06-25 NOTE — ED PROVIDER NOTES
Patient Seen in: [facility]      History   Patient presents with:  Sore Throat  Cough/URI    Stated Complaint: Congestion/Cough    HPI/Subjective:   HPI    58-year-old female presents to the immediate care with cold symptoms that began last 115/68   Pulse 84   Resp 17   Temp 97.8 °F (36.6 °C)   Temp src Temporal   SpO2 100 %   O2 Device None (Room air)       Current:/68   Pulse 84   Temp 97.8 °F (36.6 °C) (Temporal)   Resp 17   Wt 63 kg   LMP 06/12/2021   SpO2 100%   BMI 21.13 kg/m² 2:02 pm    Follow-up:  Rosalino Olguin MD  [address] [id number]  [phone]    Schedule an appointment as soon as possible for a visit in 3 days            Medications Prescribed:  Discharge Medication List as of 6/25/2021  2:03 PM

## 2021-06-25 NOTE — TELEPHONE ENCOUNTER
Patient calling for appointment today with cold symptoms. Started Tuesday with sore throat. Only had sore throat through Wednesday. No longer has sore throat. Feels she may have sinus infection.  Stuffed up, dry cough, no fever, runny nose, pressure f

## 2021-06-28 NOTE — TELEPHONE ENCOUNTER
Spoke to pt who reports she is feeling much better since UC visit on 6/25. Reports dx with sinusitis and given abx. RN advised to call is symptoms worsen or continue.

## 2021-06-28 NOTE — TELEPHONE ENCOUNTER
Patient called back and is wondering if a follow up is necessary. Patient states she is on antibiotics and is feeling much better. Patient does not wish to come into the office for a follow up unless Dr López Taylor finds it necessary for her to do so.

## 2021-07-26 ENCOUNTER — TELEPHONE (OUTPATIENT)
Dept: INTERNAL MEDICINE CLINIC | Facility: CLINIC | Age: 26
End: 2021-07-26

## 2021-07-26 DIAGNOSIS — Z11.1 SCREENING FOR TUBERCULOSIS: Primary | ICD-10-CM

## 2021-07-27 NOTE — TELEPHONE ENCOUNTER
Spoke to patient and notified her Quantiferon TB test ordered, she verbalized understanding. Informed patient sample needs to incubate for 18 hrs and then tests are run M,W,F so it may take a couple days for results.

## 2021-08-02 ENCOUNTER — LAB ENCOUNTER (OUTPATIENT)
Dept: LAB | Age: 26
End: 2021-08-02
Attending: INTERNAL MEDICINE
Payer: COMMERCIAL

## 2021-08-02 DIAGNOSIS — Z11.1 SCREENING FOR TUBERCULOSIS: ICD-10-CM

## 2021-08-02 PROCEDURE — 36415 COLL VENOUS BLD VENIPUNCTURE: CPT

## 2021-08-02 PROCEDURE — 86480 TB TEST CELL IMMUN MEASURE: CPT

## 2021-08-05 LAB
M TB IFN-G CD4+ T-CELLS BLD-ACNC: 0.03 IU/ML
M TB TUBERC IFN-G BLD QL: NEGATIVE
M TB TUBERC IGNF/MITOGEN IGNF CONTROL: >10 IU/ML
QFT TB1 AG MINUS NIL: 0 IU/ML
QFT TB2 AG MINUS NIL: 0 IU/ML

## 2021-09-29 ENCOUNTER — OFFICE VISIT (OUTPATIENT)
Dept: INTERNAL MEDICINE CLINIC | Facility: CLINIC | Age: 26
End: 2021-09-29

## 2021-09-29 VITALS
BODY MASS INDEX: 21.07 KG/M2 | WEIGHT: 139 LBS | SYSTOLIC BLOOD PRESSURE: 120 MMHG | RESPIRATION RATE: 16 BRPM | DIASTOLIC BLOOD PRESSURE: 78 MMHG | HEART RATE: 64 BPM | HEIGHT: 68 IN

## 2021-09-29 DIAGNOSIS — B36.0 TINEA VERSICOLOR: Primary | ICD-10-CM

## 2021-09-29 PROCEDURE — 90471 IMMUNIZATION ADMIN: CPT | Performed by: INTERNAL MEDICINE

## 2021-09-29 PROCEDURE — 3074F SYST BP LT 130 MM HG: CPT | Performed by: INTERNAL MEDICINE

## 2021-09-29 PROCEDURE — 3008F BODY MASS INDEX DOCD: CPT | Performed by: INTERNAL MEDICINE

## 2021-09-29 PROCEDURE — 90686 IIV4 VACC NO PRSV 0.5 ML IM: CPT | Performed by: INTERNAL MEDICINE

## 2021-09-29 PROCEDURE — 99212 OFFICE O/P EST SF 10 MIN: CPT | Performed by: INTERNAL MEDICINE

## 2021-09-29 PROCEDURE — 3078F DIAST BP <80 MM HG: CPT | Performed by: INTERNAL MEDICINE

## 2021-09-29 RX ORDER — SELENIUM SULFIDE 2.5 MG/100ML
LOTION TOPICAL
Qty: 1 EACH | Refills: 3 | Status: SHIPPED | OUTPATIENT
Start: 2021-09-29

## 2021-09-29 NOTE — PROGRESS NOTES
Jess Fitch is a 31year old female. Patient presents with:  Derm Problem: skin irration    HPI:     Pt with recurrence of her tinea versicolor on her trunk. No itching. Was in [place] recently and was in the sun.   Hypopigmented patches now mor Location: Right arm, Patient Position: Sitting, Cuff Size: adult)   Pulse 64   Resp 16   Ht 5' 8\" (1.727 m)   Wt 139 lb (63 kg)   LMP 06/12/2021   BMI 21.13 kg/m²   GENERAL: well developed, well nourished, in no apparent distress  SKIN: +multiple round we

## 2021-12-22 ENCOUNTER — TELEPHONE (OUTPATIENT)
Dept: INTERNAL MEDICINE CLINIC | Facility: CLINIC | Age: 26
End: 2021-12-22

## 2021-12-22 ENCOUNTER — HOSPITAL ENCOUNTER (OUTPATIENT)
Age: 26
Discharge: HOME | End: 2021-12-22
Payer: COMMERCIAL

## 2021-12-22 VITALS
HEART RATE: 70 BPM | DIASTOLIC BLOOD PRESSURE: 72 MMHG | RESPIRATION RATE: 16 BRPM | BODY MASS INDEX: 20 KG/M2 | SYSTOLIC BLOOD PRESSURE: 121 MMHG | OXYGEN SATURATION: 100 % | WEIGHT: 132 LBS | HEIGHT: 68 IN | TEMPERATURE: 97 F

## 2021-12-22 DIAGNOSIS — J02.9 SORE THROAT: ICD-10-CM

## 2021-12-22 DIAGNOSIS — Z20.822 ENCOUNTER FOR LABORATORY TESTING FOR COVID-19 VIRUS: Primary | ICD-10-CM

## 2021-12-22 PROCEDURE — 99213 OFFICE O/P EST LOW 20 MIN: CPT | Performed by: NURSE PRACTITIONER

## 2021-12-22 PROCEDURE — 87880 STREP A ASSAY W/OPTIC: CPT | Performed by: NURSE PRACTITIONER

## 2021-12-22 PROCEDURE — U0002 COVID-19 LAB TEST NON-CDC: HCPCS | Performed by: NURSE PRACTITIONER

## 2021-12-22 NOTE — TELEPHONE ENCOUNTER
Pt has a sore throat that started during the night  She does not have a fever or any other symptoms   Wants to schedule an appt with Dr Ron Govea tomorrow to get tested for strep throat    Please call to advise [phone]

## 2021-12-22 NOTE — TELEPHONE ENCOUNTER
Respiratory infection triage:    Fever:  [x]  No fever  []  Fever>100.4    Cough:  [] Tight cough  [] Cough with exertion  [] Dry cough  [] Sputum production, Color:     Breathing:  [] Mild shortness of breath interfering with activity  [] Wheezing  [] Mar Moraes

## 2021-12-23 NOTE — ED PROVIDER NOTES
Patient Seen in: [facility]    History   Patient presents with:  Sore Throat    Stated Complaint: strep test car [phone]    HPI    Debbie Mood is a 31year old female who presents to immediate care requesting testing for COVI Loratadine (CLARITIN OR),  Take 1 tablet by mouth daily as needed.        Family History   Problem Relation Age of Onset   • Other (Other) Mother         MOM W/ BREAST FIBRORYSTIS DISEASE   • Diabetes Father 52   • Diabetes Paternal Grandmother    • Diabete the chest wall. No CVA tenderness bilaterally. Respiratory: Respiratory effort was normal.  There is no rales, wheezes, or rhonchi. There is no stridor. Air entry is equal.  Cardiovascular: Regular rate and rhythm, no murmurs, gallops, rubs.   Capillary including reassessment of your blood pressure.     Medications Prescribed:  Current Discharge Medication List

## 2023-01-30 NOTE — TELEPHONE ENCOUNTER
Pt saw Dr Ai Childs yesterday she needs a note to return to work, pt said she is not sure if she can return tomorrow but is hoping to.   Please call pt when ready her mom will  today, [phone]    Tasked to nursing 1-1.9 cm

## 2023-05-08 NOTE — TELEPHONE ENCOUNTER
All labs normal except some blood in her urine -- did she have her period? verbal instruction/written material

## 2023-10-03 ENCOUNTER — OFFICE VISIT (OUTPATIENT)
Dept: DERMATOLOGY CLINIC | Facility: CLINIC | Age: 28
End: 2023-10-03

## 2023-10-03 DIAGNOSIS — B36.0 TINEA VERSICOLOR: ICD-10-CM

## 2023-10-03 DIAGNOSIS — L70.0 ACNE VULGARIS: Primary | ICD-10-CM

## 2023-10-03 PROCEDURE — 99204 OFFICE O/P NEW MOD 45 MIN: CPT | Performed by: STUDENT IN AN ORGANIZED HEALTH CARE EDUCATION/TRAINING PROGRAM

## 2023-10-03 RX ORDER — CLASCOTERONE 1 G/100G
1 CREAM TOPICAL 2 TIMES DAILY
Qty: 60 G | Refills: 3 | Status: SHIPPED | OUTPATIENT
Start: 2023-10-03

## 2023-10-03 RX ORDER — FLUCONAZOLE 100 MG/1
100 TABLET ORAL DAILY
Qty: 10 TABLET | Refills: 0 | Status: SHIPPED | OUTPATIENT
Start: 2023-10-03

## 2023-10-03 NOTE — PROGRESS NOTES
October 3, 2023    Established patient     CHIEF COMPLAINT: Acne and vitiligo     HISTORY OF PRESENT ILLNESS: .    1. Acne   Location:  Face  jaw line    Duration: 3 months  Signs and symptoms: itchy sometimes  Current treatment: over the counter  Past treatments: none    2. Vitiligo   Location: shoulders, arms   Duration: <5 years   Signs and symptoms: discoloration  Current treatment: none  Past treatments: none      DERM HISTORY:  History of skin cancer: No  History of chronic skin disease/condition: No    FAMILY HISTORY:  History of melanoma: No  History of chronic skin disease/condition: No    History/Other:    REVIEW OF SYSTEMS:  Constitutional: Denies fever, chills, unintentional weight loss. Skin as per HPI    PAST MEDICAL HISTORY:  Past Medical History:   Diagnosis Date    Anxiety state, unspecified 2003    Paxil    Attention deficit disorder with hyperactivity(314.01) 2003    Adderoll    Chickenpox 5/97    Cleft palate 7/18/94 - 12/7/94    Surg. Febrile convulsions (simple), unspecified 12/1/95    Seen at [facility]    Hearing loss 9/02 - 11/03    Low frequency hearing loss - Dr. 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 Sertraline HCl 50 MG Oral Tab       Methylphenidate HCl ER 36 MG Oral Tab CR Take 2 tablets by mouth daily. Norethin-Eth Estradiol-Fe 0.8-25 MG-MCG Oral Chew Tab Chew  by mouth. Loratadine (CLARITIN OR) Take 1 tablet by mouth daily as needed.          Objective:    PHYSICAL EXAM:  General: awake, alert, no acute distress  Skin: Skin exam was performed today including the following: face and chest. Pertinent findings include:   - chest with hypopigmented macules and patches  - jawline with erythematous papules    ASSESSMENT & PLAN:  Pathophysiology of diagnoses discussed with patient. Therapeutic options reviewed. Risks, benefits, and alternatives discussed with patient. Instructions reviewed at length. #Tinea Versicolor - cannot entirely exclude vitiligo given depigmented macules on arms   - Start diflucan 100mg daily for 10 days     #Acne Vulgaris - stopped OCP 11/2022  - Start winlevi once daily to affected areas   - In reserve: alternate aldactone topical at [address] vs oral aldactone  - Medications not safe in pregnancy/breastfeeding. Stop and notify us if you become pregnant or plan to become pregnant.       Return to clinic: 3 months or sooner if something concerning arises     Ck Linton MD

## 2023-10-04 NOTE — TELEPHONE ENCOUNTER
Fax from [phone] required for Mary Max: [facility]

## 2023-10-04 NOTE — TELEPHONE ENCOUNTER
Medication PA Requested:    winlewi 1% cream                                                      CoverMyMeds Used: yes  Key:[id number]  Quantity: 60gm  Day Supply: 30  Sig: apply BID  DX Code:     L70.0                                CPT code (if applicable):   Case Number/Pending Ref#:\    CMM submitted with LOV 10/3/23  Awaiting determination

## 2023-10-04 NOTE — TELEPHONE ENCOUNTER
Medication PA Requested:    winlewi 1% cream                                                      CoverMyMeds Used:  Key:  Quantity: 60gm  Day Supply: 30  Sig: apply BID  DX Code:     L70.0                                CPT code (if applicable):   Case Number/Pending Ref#:\

## 2023-10-05 NOTE — TELEPHONE ENCOUNTER
Requirements needed: documentation of failure , contradiction, or intolerance to one topical retinoid and three other prescription topicals. Unaffordable with [facility]. Alternatives?

## 2023-10-11 ENCOUNTER — PATIENT MESSAGE (OUTPATIENT)
Dept: INTERNAL MEDICINE CLINIC | Facility: CLINIC | Age: 28
End: 2023-10-11

## 2023-10-11 ENCOUNTER — TELEPHONE (OUTPATIENT)
Dept: DERMATOLOGY CLINIC | Facility: CLINIC | Age: 28
End: 2023-10-11

## 2023-10-11 NOTE — TELEPHONE ENCOUNTER
Ok to hold for a few days but please let her know I do not think it is related. The medicine is not absorbed enough to cause GI issues. Please have her retry when she begins to feel better.      Abbey Dalton MD

## 2023-10-11 NOTE — TELEPHONE ENCOUNTER
S/w pt. Seen 10/3/23 for acne, started winlewi on 10/6, has been applying pea size amount to face BID, developed nausea after each meal 10/7, feels tired. No vomiting. Has not started any other meds. Denies possibility of pregnancy. She is holding winlewi today, she does attribute her symptoms to it. How do you want to proceed here?

## 2023-10-12 ENCOUNTER — TELEPHONE (OUTPATIENT)
Dept: INTERNAL MEDICINE CLINIC | Facility: CLINIC | Age: 28
End: 2023-10-12

## 2023-10-12 NOTE — TELEPHONE ENCOUNTER
Spoke with patient states she has had nausea since Saturday night after eating Pizza. Patient states since then she is nauseous every time she eats. Patient able to drink with no problems. Patient has started trying to eat bland foods and crackers and that has helped a little. Denies abdominal pain, diarrhea, constipation, vomiting, gas, bloating, fever. Had negative covid and pregnancy test. States the only other new thing was acne cream Jp Hernandes that she started on Friday morning twice daily and stopped on Monday due to symptoms.  States prescribing physician does not think her nausea is related to cream.  No OTC medications    To Dr. Johnson Sensing please advise      [id number]

## 2023-11-27 ENCOUNTER — TELEPHONE (OUTPATIENT)
Dept: INTERNAL MEDICINE CLINIC | Facility: CLINIC | Age: 28
End: 2023-11-27

## 2023-11-27 NOTE — TELEPHONE ENCOUNTER
Nursing: There was a cancellation tomorrow, 11/28 with Dr Sohail Leong at 3:30pm. Patient was called by  and moved her appointment to tomorrow. FYI. No call back needed.

## 2023-11-27 NOTE — TELEPHONE ENCOUNTER
Patient is calling with right arm pain that is starting to move into her shoulder and neck. Patient states she has had the pain for a month now. Patient was hoping to see Dr Mariama Mary sometime this week, no openings. Patient is currently scheduled on 12/12/2023 at 1:30pm with Dr Mariama Mary (Dr Mariama Mary next opening) but is hoping to be seen sooner.

## 2023-11-28 ENCOUNTER — OFFICE VISIT (OUTPATIENT)
Dept: INTERNAL MEDICINE CLINIC | Facility: CLINIC | Age: 28
End: 2023-11-28

## 2023-11-28 VITALS
RESPIRATION RATE: 16 BRPM | HEIGHT: 68 IN | TEMPERATURE: 97 F | SYSTOLIC BLOOD PRESSURE: 102 MMHG | BODY MASS INDEX: 22.13 KG/M2 | WEIGHT: 146 LBS | OXYGEN SATURATION: 98 % | HEART RATE: 56 BPM | DIASTOLIC BLOOD PRESSURE: 60 MMHG

## 2023-11-28 DIAGNOSIS — M75.41 SHOULDER IMPINGEMENT SYNDROME, RIGHT: ICD-10-CM

## 2023-11-28 DIAGNOSIS — S46.211A BICEPS STRAIN, RIGHT, INITIAL ENCOUNTER: Primary | ICD-10-CM

## 2023-11-28 PROCEDURE — 3078F DIAST BP <80 MM HG: CPT | Performed by: INTERNAL MEDICINE

## 2023-11-28 PROCEDURE — 99213 OFFICE O/P EST LOW 20 MIN: CPT | Performed by: INTERNAL MEDICINE

## 2023-11-28 PROCEDURE — 3074F SYST BP LT 130 MM HG: CPT | Performed by: INTERNAL MEDICINE

## 2023-11-28 PROCEDURE — 3008F BODY MASS INDEX DOCD: CPT | Performed by: INTERNAL MEDICINE

## 2023-11-28 RX ORDER — MELOXICAM 7.5 MG/1
7.5 TABLET ORAL DAILY
Qty: 30 TABLET | Refills: 2 | Status: SHIPPED | OUTPATIENT
Start: 2023-11-28

## 2024-11-25 ENCOUNTER — TELEPHONE (OUTPATIENT)
Dept: INTERNAL MEDICINE CLINIC | Facility: CLINIC | Age: 29
End: 2024-11-25

## 2024-11-25 NOTE — TELEPHONE ENCOUNTER
Pt. Called stating she has been having lower right abdominal pain that radiates to her belly button.  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  She was told it was constipation, but she continues to have pain.  She said she took magnesium citrate but she is still having the pain.  Pt. Asking if  Will order her another CT and or and [facility] to see if anything has changed.

## 2024-11-25 NOTE — TELEPHONE ENCOUNTER
To  - called patient who states she has right lower abdominal pain - is a nurse at [employer] , had a CT done - showed constipation -after mag citrate she went to the bathroom but the pain is still there > on CT her appendix could not be seen properly per patient . RN advised patient to go back to the ER -she will try to see someone at [facility]     Patient verbalizes understanding that noncompliance with the medical recommendations provided could result in increased risk of morbidity or even mortality.       Last seen in November 2013

## 2024-11-26 NOTE — TELEPHONE ENCOUNTER
Called patient who went back to ER - had CT with contrast - ovarian cyst - already had F/U with gyn  As FYI to

## (undated) NOTE — MR AVS SNAPSHOT
[facility] [address] [address]  [phone]               Thank you for choosing us for your health care visit with Avila Muñoz DO. We are glad to serve you and happy to provide you with this summary of your visit.   Please he

## (undated) NOTE — LETTER
1/29/2018          To Whom It May Concern:    Prisca Matias is currently under my medical care and may return to work at this time. If you require additional information please contact our office.         Sincerely,    Naomi Drummond MD